# Patient Record
Sex: MALE | Race: WHITE | Employment: UNEMPLOYED | URBAN - METROPOLITAN AREA
[De-identification: names, ages, dates, MRNs, and addresses within clinical notes are randomized per-mention and may not be internally consistent; named-entity substitution may affect disease eponyms.]

---

## 2019-01-10 ENCOUNTER — HOSPITAL ENCOUNTER (INPATIENT)
Age: 69
LOS: 4 days | Discharge: HOME | DRG: 881 | End: 2019-01-14
Attending: PSYCHIATRY & NEUROLOGY | Admitting: PSYCHIATRY & NEUROLOGY
Payer: MEDICARE

## 2019-01-10 PROBLEM — F32.9 MAJOR DEPRESSION: Status: ACTIVE | Noted: 2019-01-10

## 2019-01-10 PROCEDURE — 65220000003 HC RM SEMIPRIVATE PSYCH

## 2019-01-10 RX ORDER — BENZTROPINE MESYLATE 1 MG/ML
2 INJECTION INTRAMUSCULAR; INTRAVENOUS
Status: DISCONTINUED | OUTPATIENT
Start: 2019-01-10 | End: 2019-01-14 | Stop reason: HOSPADM

## 2019-01-10 RX ORDER — ADHESIVE BANDAGE
30 BANDAGE TOPICAL DAILY PRN
Status: DISCONTINUED | OUTPATIENT
Start: 2019-01-10 | End: 2019-01-14 | Stop reason: HOSPADM

## 2019-01-10 RX ORDER — LORAZEPAM 2 MG/ML
2 INJECTION INTRAMUSCULAR
Status: DISCONTINUED | OUTPATIENT
Start: 2019-01-10 | End: 2019-01-14 | Stop reason: HOSPADM

## 2019-01-10 RX ORDER — ZOLPIDEM TARTRATE 5 MG/1
5 TABLET ORAL
Status: DISCONTINUED | OUTPATIENT
Start: 2019-01-10 | End: 2019-01-14 | Stop reason: HOSPADM

## 2019-01-10 RX ORDER — IBUPROFEN 400 MG/1
400 TABLET ORAL
Status: DISCONTINUED | OUTPATIENT
Start: 2019-01-10 | End: 2019-01-14 | Stop reason: HOSPADM

## 2019-01-10 RX ORDER — IBUPROFEN 200 MG
1 TABLET ORAL
Status: DISCONTINUED | OUTPATIENT
Start: 2019-01-10 | End: 2019-01-14 | Stop reason: HOSPADM

## 2019-01-10 RX ORDER — LORAZEPAM 1 MG/1
1 TABLET ORAL
Status: DISCONTINUED | OUTPATIENT
Start: 2019-01-10 | End: 2019-01-11

## 2019-01-10 RX ORDER — ZOLPIDEM TARTRATE 10 MG/1
10 TABLET ORAL
Status: DISCONTINUED | OUTPATIENT
Start: 2019-01-10 | End: 2019-01-10 | Stop reason: DRUGHIGH

## 2019-01-10 RX ORDER — ACETAMINOPHEN 325 MG/1
650 TABLET ORAL
Status: DISCONTINUED | OUTPATIENT
Start: 2019-01-10 | End: 2019-01-14 | Stop reason: HOSPADM

## 2019-01-10 RX ORDER — OLANZAPINE 5 MG/1
5 TABLET ORAL
Status: DISCONTINUED | OUTPATIENT
Start: 2019-01-10 | End: 2019-01-14 | Stop reason: HOSPADM

## 2019-01-10 RX ORDER — BENZTROPINE MESYLATE 2 MG/1
2 TABLET ORAL
Status: DISCONTINUED | OUTPATIENT
Start: 2019-01-10 | End: 2019-01-14 | Stop reason: HOSPADM

## 2019-01-10 NOTE — PROGRESS NOTES
Hospitalist answering service called to request return call from Hospitalist, to notify of consult. [address]  Dr. Krishna Martinez returned call, notified of consult.

## 2019-01-10 NOTE — BH NOTES
Patient admitted voluntarily to [address] [place] [address] Psychiatry, under the services of Josefa Abdalla. Patient currently denies suicidal ideation. Patient currently denies homicidal ideation. Patient verbally contracts for safety. Patient denies psychotic symptoms. Pt denies ETOH use. Pt denies drug use.

## 2019-01-10 NOTE — PROGRESS NOTES
Primary Nurse Ramon Lin RN and Gumaro Barnes RN performed a dual skin assessment on this patient . Patient has some bruising on his left butt cheek. He believes this is from sleeping on the ground. The patient is in no pain. Cristhian score is 23 Patient was admitted to unit by Dr. Juliet Gonzalez for MDD. Patient came voluntarily onto unit, calm, cooperative, smiling. Patient requested and was given something to drink and a snack. Patient was given tour of unit, unit handbook.  
 
JEWELRY:  Patient kept his watch on

## 2019-01-11 PROCEDURE — 74011250637 HC RX REV CODE- 250/637: Performed by: NURSE PRACTITIONER

## 2019-01-11 PROCEDURE — 65220000003 HC RM SEMIPRIVATE PSYCH

## 2019-01-11 PROCEDURE — 74011250637 HC RX REV CODE- 250/637: Performed by: PSYCHIATRY & NEUROLOGY

## 2019-01-11 RX ORDER — ATORVASTATIN CALCIUM 40 MG/1
40 TABLET, FILM COATED ORAL
Status: DISCONTINUED | OUTPATIENT
Start: 2019-01-11 | End: 2019-01-14 | Stop reason: HOSPADM

## 2019-01-11 RX ORDER — SERTRALINE HYDROCHLORIDE 50 MG/1
25 TABLET, FILM COATED ORAL DAILY
Status: DISCONTINUED | OUTPATIENT
Start: 2019-01-11 | End: 2019-01-14 | Stop reason: HOSPADM

## 2019-01-11 RX ORDER — GUAIFENESIN 100 MG/5ML
81 LIQUID (ML) ORAL DAILY
Status: DISCONTINUED | OUTPATIENT
Start: 2019-01-12 | End: 2019-01-14 | Stop reason: HOSPADM

## 2019-01-11 RX ORDER — HYDROXYZINE 25 MG/1
25 TABLET, FILM COATED ORAL
Status: DISCONTINUED | OUTPATIENT
Start: 2019-01-11 | End: 2019-01-14 | Stop reason: HOSPADM

## 2019-01-11 RX ADMIN — SERTRALINE HYDROCHLORIDE 25 MG: 50 TABLET ORAL at 11:53

## 2019-01-11 RX ADMIN — HYDROXYZINE HYDROCHLORIDE 25 MG: 25 TABLET, FILM COATED ORAL at 20:15

## 2019-01-11 RX ADMIN — ATORVASTATIN CALCIUM 40 MG: 40 TABLET, FILM COATED ORAL at 21:30

## 2019-01-11 NOTE — PROGRESS NOTES
Problem: Falls - Risk of 
Goal: *Absence of Falls Document Frank Braga Fall Risk and appropriate interventions in the flowsheet. Outcome: Progressing Towards Goal 
Fall Risk Interventions: 
  
 
  
 
Medication Interventions: Teach patient to arise slowly

## 2019-01-11 NOTE — BH NOTES
GROUP THERAPY PROGRESS NOTE Reed Mueller did not participate in the 70 minute General Unit's Process Group, with a focus on setting a direction or goal, identifying feelings and planning for the day.

## 2019-01-11 NOTE — INTERDISCIPLINARY ROUNDS
Behavioral Health Interdisciplinary Rounds Patient Name: Sharri Eastman  Age: 80 y.o. Room/Bed:  729/ Primary Diagnosis: <principal problem not specified> Admission Status: Voluntary Readmission within 30 days: no 
Power of  in place: no 
Patient requires a blocked bed: no          Reason for blocked bed: VTE Prophylaxis: Not indicated Mobility needs/Fall risk: no 
Flu Vaccine : no refused Nutritional Plan: no 
Consults:         
Labs/Testing due today?: yes Sleep hours:       
Participation in Care/Groups:  yes Medication Compliant?: No scheduled meds PRNS (last 24 hours): None Restraints (last 24 hours):  no 
  
CIWA (range last 24 hours): CIWA-Ar Total: 0  
COWS (range last 24 hours): COWS Total: 1 Alcohol screening (AUDIT) completed -   AUDIT Score: 0 If applicable, date SBIRT discussed in treatment team AND documented:  
 
Tobacco - patient is a smoker: Have You Used Tobacco in the Past 30 Days: Yes Illegal Drugs use: Have You Used Any Illegal Substances Over the Past 12 Months: No 
 
24 hour chart check complete: yes Patient goal(s) for today: Meet with tx team to develop course of tx Treatment team focus/goals: Complete Psych Social Assessment Progress note :  
 
LOS:  1  Expected LOS:  
 
Financial concerns/prescription coverage:   
Date of last family contact:     
Family requesting physician contact today:   
Discharge plan: TBD Homeless with no support system Guns in the home: Denied Outpatient provider(s)Participating treatment team members: Beltran Thurman RN and

## 2019-01-11 NOTE — BH NOTES
CM Note: TAWANDA left VM with Graciela Roach(  who is the coordinator for [facility]) @  [phone] to call back to discuss possible assistant with pt to enter their program

## 2019-01-11 NOTE — BH NOTES
PSYCHOSOCIAL ASSESSMENT 
:Patient identifying info: 
Abdifatah Almaguer is a 80 y.o., male admitted 1/10/2019  2:48 PM  
 
Presenting problem and precipitating factors: Pt was voluntarily admitted to the hospital due to depression and SI plan of jumping off a bridge. Pt cited feeling hopeless and having no support. He stated his son and his whereabouts are unknown. Team discussed current state of homelessness, lack of mental health tx Pt agreed to take medication for depression and follow up with Mental status assessment: Sad, feeling hopeless  and helpless Collateral information: None Current psychiatric /substance abuse providers and contact info:  Pt is not currently receiving any mental health services. Previous psychiatric/substance abuse providers and response to treatment: [facility]  
 
Family history of mental illness or substance abuse:  
 
Substance abuse history:  Pt has history of using tobacco and he is a daily smoker. He denied using any other drugs or alcohol. His UDS was negative for tested drugs. Social History Tobacco Use  Smoking status: Current Every Day Smoker  Smokeless tobacco: Never Used Substance Use Topics  Alcohol use: No  
  Frequency: Never History of biomedical complications associated with substance abuse : N/A Patient's current acceptance of treatment or motivation for change: 
 
Family constellation: One adult son Is significant other involved? N/A Describe support system: None Describe living arrangements and home environment: Pt is , has one son ( unk address) and he is homeless. It is noted that pt has janet homeless for the past five years and he refused to be placed in a shelter. Pt prefers to sleep outdoors. Health issues: Review H&P Hospital Problems  Never Reviewed Codes Class Noted POA Major depression ICD-10-CM: F32.9 ICD-9-CM: 296.20  1/10/2019 Unknown Trauma history: Nightmares connected to [employer] Legal issues: Pt denied any current legal issues. History of  service: [facility] 7 yrs. Financial status: None Advent/cultural factors: Not voiced Education/work history:  HS grad and currently unemployed Have you been licensed as a health care professional (current or ):  No 
 
Leisure and recreation preferences:  
 
Describe coping skills:  Ineffective and poor judgement Jair Jacob 2019

## 2019-01-11 NOTE — PROGRESS NOTES
01/10/19 1900 Vital Signs Temp (refused) Pulse (Heart Rate) (refused) Resp Rate (refused) O2 Sat (%) (refused) Level of Consciousness Alert  
BP (refused) No MEWS score generated, as pt refuses to allow staff to check vital signs.

## 2019-01-11 NOTE — PROGRESS NOTES
[facility] 60 Master Treatment Plan for Keri Mora Date Treatment Plan Initiated: 1/11/19 Treatment Plan Modalities: 
Type of Modality Amount (x minutes) Frequency (x/week) Duration (x days) Name of Responsible Staff Community & wrap-up meetings to encourage peer interactions 15 7 [facility] [facility] Group psychotherapy to assist in building coping skills and internal controls 60 7 [address] Therapeutic activity groups to build coping skills 60 7 1 Garland Vazquez Psychoeducation in group setting to address:  
Medication education [facility] 224 Coping skills Relaxation techniques Symptom management [facility]charge planning [facility] Spirituality [address] [facility] 60 1 1 volunteer Recovery/AA/NA 
    volunteer Physician medication management 15 7 1 Dr Vitaliy Parham Family meeting/discharge planning Froylan Newby Problem: Depressed Mood (Adult/Pediatric)  These goals will be met by 1/15/18 Goal: *STG: Participates in treatment plan Outcome: Progressing Towards Goal 
Up on unit for meal only declined to attend groups states will not participate while here. Mood and affect slight suspicious and defensive. Daily goal is to sleep Goal: *STG: Verbalizes anger, guilt, and other feelings in a constructive manor Outcome: Progressing Towards Goal 
Angry about his lack of finances and housing Goal: *STG: Attends activities and groups Outcome: Not Progressing Towards Goal 
Declines to participate Goal: *STG: Demonstrates reduction in symptoms and increase in insight into coping skills/future focused Outcome: Progressing Towards Goal 
States only SI when he is cold and tired from being homeless and sleeping on sidewalk Goal: *STG: Complies with medication therapy Outcome: Progressing Towards Goal 
States he will accept zoloft Goal: Interventions Outcome: Progressing Towards Goal 
Staff focus is limit setting, offering support and medication education
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
## 2019-01-11 NOTE — PROGRESS NOTES
Admission Medication Reconciliation: 
 
Information obtained from:  Patient interview Comments/Recommendations: Updated PTA meds/reviewed patient's allergies. 1)  Patient reports getting medications through the [address] pharmacy in the past. He reports being on lisinopril (unknown dose) and something for cholesterol. The patient reports non-compliance to his medications as he is homeless. Pharmacist was unable to confirm medications with the [place]. 2)  Medication changes (since last review): Added 
- lisinopril - unknown dose, has NOT been taking PTA Allergies:  Patient has no known allergies. Significant PMH/Disease States:  
Past Medical History:  
Diagnosis Date  Hypertension  Suicidal thoughts Chief Complaint for this Admission:  No chief complaint on file. Prior to Admission Medications:  
Prior to Admission Medications Prescriptions Last Dose Informant Patient Reported? Taking? LISINOPRIL PO Not Taking at Unknown time  Yes No  
  
Facility-Administered Medications: None Alo Lopez, PharmD, BCPP, BCPS Clinical Pharmacy Specialist, [address] [place] [address]

## 2019-01-11 NOTE — BH NOTES
GROUP THERAPY PROGRESS NOTE Elizabeth Dooley is participating in reflection group. Group time: 15 minutes Personal goal for participation: Daily progress Goal orientation: personal 
 
Group therapy participation: passive Therapeutic interventions reviewed and discussed: Unit rules and regulations. 10 tips to be grateful and consegently be more happy. Impression of participation: Passive

## 2019-01-11 NOTE — CONSULTS
Hospitalist Consult for Medical H&P Note  RITU Lizarraga  Phone [phone]  Call physician on-call through the  7pm-7am    Primary Care Provider: None  Source of Information: Patient/Chart     History of Presenting Illness:   Pt transferred to [facility] [place] from [place] [address] for psychiatric care. Pt apparently homeless, has been having worsening depression and having suicidal ideations. Pmhx includes htn, CAD with stents, stroke. Denies hx of diabetes. + 1 ppd smoker. Pt indicated that he has trouble walking at times due to leg weakness and dizziness and has fallen down \"alot\". Review of Systems:  General: negative for fever, chills, sweats. Eyes: negative for changes or loss in vision  Ear Nose and Throat: negative for rhinorrhea, otalgia, speech or swallowing difficulties  Respiratory:  negative for cough, sputum production, SOB, wheezing, BENOIT  Cardiology:  negative for chest pain, palpitations, orthopnea, edema. Pt reports some dizziness at times  Gastrointestinal: negative for abdominal pain, N/V, change in bowel habits (hx of intermittent diarrhea, none at present)  Genitourinary: negative for frequency, urgency, dysuria, hematuria. Has had some urinary incontinence for past year  Musculoskeletal : negative for arthralgia, myalgia. + BLE weakness (not new)  Skin: negative for easy bruising, bleeding, negative for rash, ulceration, new lesion  Neurological: negative for headache, confusion or memory loss. + R arm weakness and some dizziness  Psychological: negative for anxiety, depression, agitation    Past Medical History:   Diagnosis Date    Hypertension     Suicidal thoughts       No past surgical history on file. Pt was supposed to be taking lisinopril or combo med - he does not recall specifics.     No Known Allergies     SOCIAL HISTORY:  Smoking history:   None    Former    Chronic xx     CODE STATUS:  DNR xx   Full    Other      Objective:   Physical Exam:   Visit Vitals  BP (!) 171/95   Pulse 60   Temp 97.4 °F (36.3 °C)   Resp 18   SpO2 94%         General:  Alert, cooperative, no distress, appears stated age. HEENT:  Normocephalic, atraumatic. Conjunctivae/corneas clear. EOMs intact. Nares nl. Septum midline. Mucosa nl. No drainage or sinus tenderness. Back:   Symmetric, no curvature. ROM nl.     Lungs:   Clear to auscultation bilaterally. No Wheezing/Rhonchi/Rales. No SOB, no accessory muscle use and on RA    Heart:  Regular rate and rhythm. No murmur    Abdomen:   Soft, non-tender. Bowel sounds nl. Extremities: Extremities nl, atraumatic, no edema. Pulses 2+ and symmetric    Skin: Skin color, texture, turgor nl. No rashes or lesions. Psych: Cooperative, not anxious or agitated. A/O x 3. Neurologic: CNII-XII grossly intact. Moving all extremities, speech clear. Pt with R arm weakness, grasp 3/5 on right. Sensation is intact. Data Review:   24 Hour Results:  Labs from [place] reviewed. Assessment/Plan     Depression with Suicidal Ideations: treatment as per primary team    Hx CAD with Stents:  - was on plavix but he indicated his physician told him he did not need to continue this, estimates stents done ~ 1 year ago  - adding baby ASA  - start lipitor nightly    Hx Stroke (x 2 in 2008):  - ASA and statin to start  - has R sided residual arm weakness and reports BLE weakness  - obtain lipid panel  - PT as needed    Hx Hypertension:   - BP up and down per [facility] records. Pt not taking medications PTA. - monitor this - he has been on prinzide 20/25 in the past and if blood pressures are persistently high, can restart    Hx tobacco Use:   - 1 ppd smoker  - declines nicotine patch    Code Status: Full  DVT ppx : none indicated, pt up ad jose  Care Plan Discussed with: patient  Dispo: as per primary team    Thank you for giving us opportunity to participate in this patients care.  Will sign off at this time, please re-consult if there are any further medical management needs or questions       Signed By: Chapo Giron NP     January 11, 2019

## 2019-01-12 PROCEDURE — 74011250637 HC RX REV CODE- 250/637: Performed by: NURSE PRACTITIONER

## 2019-01-12 PROCEDURE — 65220000003 HC RM SEMIPRIVATE PSYCH

## 2019-01-12 PROCEDURE — 74011250637 HC RX REV CODE- 250/637: Performed by: PSYCHIATRY & NEUROLOGY

## 2019-01-12 RX ADMIN — SERTRALINE HYDROCHLORIDE 25 MG: 50 TABLET ORAL at 10:39

## 2019-01-12 RX ADMIN — ASPIRIN 81 MG CHEWABLE TABLET 81 MG: 81 TABLET CHEWABLE at 10:39

## 2019-01-12 NOTE — PROGRESS NOTES
Problem: Depressed Mood (Adult/Pediatric) Goal: *STG: Remains safe in hospital 
Outcome: Progressing Towards Goal 
Patient is calm and cooperative. Sitting in the day room. No distress noted.

## 2019-01-12 NOTE — PROGRESS NOTES
Problem: Depressed Mood (Adult/Pediatric) Goal: *STG: Verbalizes anger, guilt, and other feelings in a constructive manor Outcome: Progressing Towards Goal 
Patient calm/cooperative. Staff will continue q15 checks and encourage pt to share feelings.

## 2019-01-12 NOTE — PROGRESS NOTES
Problem: Falls - Risk of 
Goal: *Absence of Falls Document Anusha Patel Fall Risk and appropriate interventions in the flowsheet. Outcome: Progressing Towards Goal 
Fall Risk Interventions: 
Medication Interventions: Teach patient to arise slowly Received pt lying quietly in bed, appears to be asleep. No pain or distress noted. Respirations even and unlabored. Will continue to monitor q15 min for safety.

## 2019-01-12 NOTE — BH NOTES
GROUP THERAPY PROGRESS NOTE Shekhar Swanson is participating in Reflection Group Group time: 15 mins Personal goal for participation: [facility] 
 
Goal orientation: personal 
 
Group therapy participation: active Therapeutic interventions reviewed and discussed:  Tech discussed rules of unit, importance of goals, encouraged goals, positivity and healing. Impression of participation: Active and listened

## 2019-01-12 NOTE — PROGRESS NOTES
Problem: Depressed Mood (Adult/Pediatric) Goal: *STG: Participates in treatment plan Outcome: Progressing Towards Goal 
Pt is med compliant. States he is continuing to feel depressed. He is quiet and withdrawn. SW working on shelter and discharge plan 
 
Problem: Falls - Risk of 
Goal: *Absence of Falls Document Ebb Frisian Fall Risk and appropriate interventions in the flowsheet. Fall Risk Interventions: 
  
 
  
 
Medication Interventions: Teach patient to arise slowly

## 2019-01-12 NOTE — INTERDISCIPLINARY ROUNDS
Behavioral Health Interdisciplinary Rounds Patient Name: Miguel Medrano  Age: 80 y.o. Room/Bed:  729/ Primary Diagnosis: <principal problem not specified> Admission Status: Voluntary Readmission within 30 days: no 
Power of  in place: no 
Patient requires a blocked bed: no          Reason for blocked bed: VTE Prophylaxis: No 
 
Mobility needs/Fall risk: no 
Flu Vaccine : no  
Nutritional Plan: no 
Consults:         
Labs/Testing due today?: yes  *Refused* Sleep hours:    7 Participation in Care/Groups:  yes Medication Compliant?: Yes PRNS (last 24 hours): Antianxiety Restraints (last 24 hours):  no 
  
CIWA (range last 24 hours): CIWA-Ar Total: 0  
COWS (range last 24 hours): COWS Total: 1 Alcohol screening (AUDIT) completed -   AUDIT Score: 0 If applicable, date SBIRT discussed in treatment team AND documented:  
 
Tobacco - patient is a smoker: Have You Used Tobacco in the Past 30 Days: Yes Illegal Drugs use: Have You Used Any Illegal Substances Over the Past 12 Months: No 
 
24 hour chart check complete: yes Patient goal(s) for today: Comply with taking meds and attending unit activities Treatment team focus/goals: Eval meds for their effectiveness Progress note: Good mood- hopeful that [facility] can help him with homelessness LOS:  2  Expected LOS:  
 
Financial concerns/prescription coverage:  No 
Date of last family contact:  None Family requesting physician contact today:  no 
Discharge plan[facility]  N/A Outpatient provider(s): Refer to [facility]  
 
Participating treatment team members: Miguel MarcelaMarco A , Dr Josias Valencia and Gladis Hendricks

## 2019-01-13 PROCEDURE — 65220000003 HC RM SEMIPRIVATE PSYCH

## 2019-01-13 PROCEDURE — 74011250637 HC RX REV CODE- 250/637: Performed by: NURSE PRACTITIONER

## 2019-01-13 PROCEDURE — 74011250637 HC RX REV CODE- 250/637: Performed by: PSYCHIATRY & NEUROLOGY

## 2019-01-13 RX ADMIN — ASPIRIN 81 MG CHEWABLE TABLET 81 MG: 81 TABLET CHEWABLE at 08:27

## 2019-01-13 NOTE — BH NOTES
Patient is disengaged and often lying in his bed. When nurse attempted to speak with patient, he has an angry facial expression and did not answer. He only stared at nurse with an angry facial expression. Nurse quietly entered room to see about checking his vital signs, patient was angry, \"You gotta be kidding me! \" Patient speaking in verbally aggressive tone. Refused vital signs.

## 2019-01-13 NOTE — INTERDISCIPLINARY ROUNDS
Behavioral Health Interdisciplinary Rounds Patient Name: Sharlene Mccracken  Age: 80 y.o. Room/Bed:  9/ Primary Diagnosis: <principal problem not specified> Admission Status: Voluntary Readmission within 30 days: no 
Power of  in place: no 
Patient requires a blocked bed: no          Reason for blocked bed: VTE Prophylaxis: No 
 
Mobility needs/Fall risk: no 
Flu Vaccine : no  
Nutritional Plan: no 
Consults:         
Labs/Testing due today?: yes  *Refused* Sleep hours:  7 Participation in Care/Groups:  yes Medication Compliant?: Selective PRNS (last 24 hours): None Restraints (last 24 hours):  no 
  
CIWA (range last 24 hours): CIWA-Ar Total: 0  
COWS (range last 24 hours): COWS Total: 1 Alcohol screening (AUDIT) completed -   AUDIT Score: 0 If applicable, date SBIRT discussed in treatment team AND documented:  
 
Tobacco - patient is a smoker: Have You Used Tobacco in the Past 30 Days: Yes Illegal Drugs use: Have You Used Any Illegal Substances Over the Past 12 Months: No 
 
24 hour chart check complete: yes Patient goal(s) for today:  
Treatment team focus/goals:  
Progress note LOS:  3  Expected LOS:  
 
Financial concerns/prescription coverage:   
Date of last family contact:      
Family requesting physician contact today:   
Discharge plan:  
Guns in the home: Outpatient provider(s):  
 
Participating treatment team members: Sharlene Mccracken, * (assigned SW),

## 2019-01-13 NOTE — BH NOTES
GROUP THERAPY PROGRESS NOTE 
  
Keri Mora is participating in community meeting/Wrap up Reflections Group.  
  
Group time: 30 minutes 
  
Personal goal for participation: share your goals for the day and if you met them. Also discussed will be unit routines and schedule 
  
Goal orientation: community 
  
Group therapy participation: passive 
  Therapeutic interventions reviewed and discussed:  
 +coping skills Impression of participation: quiet during group

## 2019-01-13 NOTE — PROGRESS NOTES
Problem: Depressed Mood (Adult/Pediatric) Goal: *STG: Verbalizes anger, guilt, and other feelings in a constructive manor Outcome: Progressing Towards Goal 
Patient is lying in bed with lights dim. Patient is apathetic, lying in bed and not interacting with others. Staff continue q15 checks and encourage participation in activities and verbalization of feelings.
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
## 2019-01-13 NOTE — PROGRESS NOTES
Problem: Depressed Mood (Adult/Pediatric) Goal: *STG: Verbalizes anger, guilt, and other feelings in a constructive manor Outcome: Progressing Towards Goal 
Pt able to verbalize feelings in a constructive manor. Isolating in his room. Goal: *STG: Attends activities and groups Outcome: Progressing Towards Goal 
Declined to attend the community Group. Goal: *STG: Remains safe in hospital 
Outcome: Progressing Towards Goal 
Remains safe in the hospital and continued on Q 15 minute safety checks. Goal: *STG: Complies with medication therapy Outcome: Progressing Towards Goal 
Refused to take Zoloft as scheduled. Notified Dr. Srinath Nino pt refused Zoloft and Labs.

## 2019-01-13 NOTE — PROGRESS NOTES
Problem: Falls - Risk of 
Goal: *Absence of Falls Document Anusha Patel Fall Risk and appropriate interventions in the flowsheet. Outcome: Progressing Towards Goal 
Fall Risk Interventions: 
Mentation Interventions: Adequate sleep, hydration, pain control Medication Interventions: Teach patient to arise slowly Elimination Interventions: Toilet paper/wipes in reach History of Falls Interventions: Door open when patient unattended Received pt lying in bed, quite. No pain or distress noted. Respirations even and unlabored. Will cont to  Monitor q15min for safety. Pt refused labs
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
## 2019-01-13 NOTE — BH NOTES
Pt was present for the evening group. He is withdrawn. Patient came up to med window and was cooperative. When offered bedtime med/lipitor. He said in loud voice, \"I don't need that\"

## 2019-01-14 VITALS
WEIGHT: 199 LBS | TEMPERATURE: 97.8 F | HEIGHT: 70 IN | BODY MASS INDEX: 28.49 KG/M2 | RESPIRATION RATE: 16 BRPM | OXYGEN SATURATION: 98 % | SYSTOLIC BLOOD PRESSURE: 163 MMHG | HEART RATE: 66 BPM | DIASTOLIC BLOOD PRESSURE: 79 MMHG

## 2019-01-14 LAB
CHOLEST SERPL-MCNC: 139 MG/DL
EST. AVERAGE GLUCOSE BLD GHB EST-MCNC: 117 MG/DL
HBA1C MFR BLD: 5.7 % (ref 4.2–6.3)
HDLC SERPL-MCNC: 40 MG/DL
HDLC SERPL: 3.5 {RATIO} (ref 0–5)
LDLC SERPL CALC-MCNC: 82.4 MG/DL (ref 0–100)
LIPID PROFILE,FLP: NORMAL
TRIGL SERPL-MCNC: 83 MG/DL (ref ?–150)
TSH SERPL DL<=0.05 MIU/L-ACNC: 1.97 UIU/ML (ref 0.36–3.74)
VLDLC SERPL CALC-MCNC: 16.6 MG/DL

## 2019-01-14 PROCEDURE — 80061 LIPID PANEL: CPT

## 2019-01-14 PROCEDURE — 36415 COLL VENOUS BLD VENIPUNCTURE: CPT

## 2019-01-14 PROCEDURE — 74011250637 HC RX REV CODE- 250/637: Performed by: NURSE PRACTITIONER

## 2019-01-14 PROCEDURE — 74011250637 HC RX REV CODE- 250/637: Performed by: PSYCHIATRY & NEUROLOGY

## 2019-01-14 PROCEDURE — 83036 HEMOGLOBIN GLYCOSYLATED A1C: CPT

## 2019-01-14 PROCEDURE — 84443 ASSAY THYROID STIM HORMONE: CPT

## 2019-01-14 RX ORDER — LISINOPRIL 10 MG/1
10 TABLET ORAL DAILY
Status: DISCONTINUED | OUTPATIENT
Start: 2019-01-14 | End: 2019-01-14 | Stop reason: HOSPADM

## 2019-01-14 RX ORDER — GUAIFENESIN 100 MG/5ML
81 LIQUID (ML) ORAL DAILY
Qty: 30 TAB | Refills: 0 | Status: SHIPPED | OUTPATIENT
Start: 2019-01-15

## 2019-01-14 RX ORDER — ATORVASTATIN CALCIUM 40 MG/1
40 TABLET, FILM COATED ORAL
Qty: 30 TAB | Refills: 0 | Status: SHIPPED | OUTPATIENT
Start: 2019-01-14

## 2019-01-14 RX ORDER — LISINOPRIL 10 MG/1
10 TABLET ORAL DAILY
Qty: 30 TAB | Refills: 0 | Status: SHIPPED | OUTPATIENT
Start: 2019-01-14

## 2019-01-14 RX ADMIN — ASPIRIN 81 MG CHEWABLE TABLET 81 MG: 81 TABLET CHEWABLE at 08:25

## 2019-01-14 RX ADMIN — LISINOPRIL 10 MG: 10 TABLET ORAL at 12:15
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
## 2019-01-14 NOTE — BH NOTES
Behavioral Health Transition Record to Provider Patient Name: Phillip Blackwood YOB: 1950 Medical Record Number: [id number] Date of Admission: 1/10/2019 Date of Discharge: 1/14/2019 Attending Provider: Constantine Sloan MD 
Discharging Provider: Constantine Sloan MD 
To contact this individual call [phone] and ask the  to page. If unavailable, ask to be transferred to [facility] Provider on call. [facility] Provider will be available on call 24/7 and during holidays. Primary Care Provider: None No Known Allergies Reason for Admission: Pt admitted under a voluntary basis for evere depression with suicidal ideations and an inability to care for self. Admission Diagnosis: Major depression [F32.9] * No surgery found * Results for orders placed or performed during the hospital encounter of 01/10/19 HEMOGLOBIN A1C WITH EAG Result Value Ref Range Hemoglobin A1c 5.7 4.2 - 6.3 % Est. average glucose 117 mg/dL LIPID PANEL Result Value Ref Range LIPID PROFILE Cholesterol, total 139 <200 MG/DL Triglyceride 83 <150 MG/DL  
 HDL Cholesterol 40 MG/DL  
 LDL, calculated 82.4 0 - 100 MG/DL VLDL, calculated 16.6 MG/DL  
 CHOL/HDL Ratio 3.5 0 - 5.0 TSH 3RD GENERATION Result Value Ref Range TSH 1.97 0.36 - 3.74 uIU/mL Immunizations administered during this encounter: There is no immunization history on file for this patient. Screening for Metabolic Disorders for Patients on Antipsychotic Medications 
(Data obtained from the EMR) Estimated Body Mass Index Estimated body mass index is 28.55 kg/m² as calculated from the following: 
  Height as of this encounter: 5' 10\" (1.778 m). Weight as of this encounter: 90.3 kg (199 lb). Vital Signs/Blood Pressure Visit Vitals /79 Pulse 66 Temp 97.8 °F (36.6 °C) Resp 16 Ht 5' 10\" (1.778 m) Wt 90.3 kg (199 lb) SpO2 98% BMI 28.55 kg/m² Blood Glucose/Hemoglobin A1c No results found for: GLU, GLUCPOC Lab Results Component Value Date/Time Hemoglobin A1c 5.7 2019 05:38 AM  
 
  
Lipid Panel Lab Results Component Value Date/Time Cholesterol, total 139 2019 05:38 AM  
 HDL Cholesterol 40 2019 05:38 AM  
 LDL, calculated 82.4 2019 05:38 AM  
 Triglyceride 83 2019 05:38 AM  
 CHOL/HDL Ratio 3.5 2019 05:38 AM  
 
  
Discharge Diagnosis: Major depression (ICD-10-CM: F32.9) Discharge Plan: Patient discharged to [facility] CENTER of Entry. DISCHARGE Izabela Bearden : 1950 MRN: [id number] The patient Phillip Blackwood exhibits the ability to control behavior in a less restrictive environment. Patient's level of functioning is improving. No assaultive/destructive behavior has been observed for the past 24 hours. No suicidal/homicidal threat or behavior has been observed for the past 24 hours. There is no evidence of serious medication side effects. Patient has not been in physical or protective restraints for at least the past 24 hours. If weapons involved, how are they secured? No weapons involved. Is patient aware of and in agreement with discharge plan? Yes Arrangements for medication:  Prescriptions given to Patient. [facility] [place] 
 
Arrangements for transportation home:  Taxi to [facility] Keep all follow up appointments as scheduled, continue to take prescribed medications per physician instructions. Mental health crisis number:  989 or your local mental health crisis line number at [phone] [facility]) or [phone] [facility]). Discharge Medication List and Instructions:  
Discharge Medication List as of 2019  1:52 PM  
  
START taking these medications Details  
aspirin 81 mg chewable tablet Take 1 Tab by mouth daily. , Print, Disp-30 Tab, R-0  
  
 atorvastatin (LIPITOR) 40 mg tablet Take 1 Tab by mouth nightly. , Print, Disp-30 Tab, R-0  
  
  
CONTINUE these medications which have CHANGED Details  
lisinopril (PRINIVIL, ZESTRIL) 10 mg tablet Take 1 Tab by mouth daily. , Print, Disp-30 Tab, R-0 Unresulted Labs (24h ago, onward) None To obtain results of studies pending at discharge, please contact [phone] Follow-up Information Follow up With Specialties Details Why Contact Info [address] [employer]  Call Call between 8:00am and 4:30pm to schedule an appointment with the [place]. For urgent needs, they provide same-day mental health care and 24/7 psychiatric service in our emergency room. [address] [facility]) First floor, Room [address]D-[address] [facility] 
[phone] Ext. [phone] [facility] Point of Entry  Go on 1/14/2019 The [employer] location is open Monday-Friday from 8:30am-5:00pm. Assessments are conducted until 4:00pm. [address] [place], [address] 
[phone] None    None ([phone]) Patient stated that they have no PCP Advanced Directive:  
Does the patient have an appointed surrogate decision maker? No 
Does the patient have a Medical Advance Directive? No 
Does the patient have a Psychiatric Advance Directive? No 
If the patient does not have a surrogate or Medical Advance Directive AND Psychiatric Advance Directive, the patient was offered information on these advance directives Yes and Patient declined to complete Patient Instructions: Please continue all medications until otherwise directed by physician. Tobacco Cessation Discharge Plan:  
Is the patient a smoker and needs referral for smoking cessation? Yes Patient referred to the following for smoking cessation with an appointment? Refused Patient was offered medication to assist with smoking cessation at discharge? Refused Was education for smoking cessation added to the discharge instructions? Yes Alcohol/Substance Abuse Discharge Plan:  
Does the patient have a history of substance/alcohol abuse and requires a referral for treatment? No 
Patient referred to the following for substance/alcohol abuse treatment with an appointment? Not applicable Patient was offered medication to assist with alcohol cessation at discharge? Not applicable Was education for substance/alcohol abuse added to discharge instructions? No 
 
Patient discharged to Home; discussed with patient/caregiver and provided to the patient/caregiver either in hard copy or electronically.

## 2019-01-14 NOTE — BH NOTES
Pt's personal belongings returned to Pt. Discharge instructions reviewed with Pt. All questions answered. Pt taken via wheelchair by staff to lift ride.
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
## 2019-01-14 NOTE — PROGRESS NOTES
Pharmacist Discharge Medication Reconciliation Discharging Provider: Dr. Zuleika Patel Significant PMH:  
Past Medical History:  
Diagnosis Date  Hypertension  Suicidal thoughts Chief Complaint for this Admission: No chief complaint on file. Allergies: Patient has no known allergies. Discharge Medications:  
Current Discharge Medication List  
  
 
START taking these medications Details  
aspirin 81 mg chewable tablet Take 1 Tab by mouth daily. Qty: 30 Tab, Refills: 0  
  
atorvastatin (LIPITOR) 40 mg tablet Take 1 Tab by mouth nightly. Qty: 30 Tab, Refills: 0 CONTINUE these medications which have CHANGED Details  
lisinopril (PRINIVIL, ZESTRIL) 10 mg tablet Take 1 Tab by mouth daily. Qty: 30 Tab, Refills: 0 The patient's chart, MAR and AVS were reviewed by Thomas Mejia, RAMÓND.

## 2019-01-14 NOTE — DISCHARGE INSTRUCTIONS
DISCHARGE SUMMARY    Almaz Leyva  : 1950  MRN: [id number]    The patient Sharri Eastman exhibits the ability to control behavior in a less restrictive environment. Patient's level of functioning is improving. No assaultive/destructive behavior has been observed for the past 24 hours. No suicidal/homicidal threat or behavior has been observed for the past 24 hours. There is no evidence of serious medication side effects. Patient has not been in physical or protective restraints for at least the past 24 hours. If weapons involved, how are they secured? No weapons involved. Is patient aware of and in agreement with discharge plan? Yes    Arrangements for medication:  Prescriptions given to Patient. Copy of discharge instructions to provider?:  [facility]) AT [place]    Arrangements for transportation home:  Taxi to [facility]    Keep all follow up appointments as scheduled, continue to take prescribed medications per physician instructions. Mental health crisis number:  037 or your local mental health crisis line number at [phone] [facility]) or [phone] [facility]. DISCHARGE SUMMARY from Nurse    PATIENT INSTRUCTIONS:    ·     What to do at Home:  Recommended activity: Activity as tolerated,     If you experience any of the following symptoms thoughts of harming self, feeling overwhelmed with hopelessness, please follow up with your local crisis number at [phone]. *  Please give a list of your current medications to your Primary Care Provider. *  Please update this list whenever your medications are discontinued, doses are      changed, or new medications (including over-the-counter products) are added. *  Please carry medication information at all times in case of emergency situations.     These are general instructions for a healthy lifestyle:    No smoking/ No tobacco products/ Avoid exposure to second hand smoke  Surgeon [place] Warning:  Quitting smoking now greatly reduces serious risk to your health. Obesity, smoking, and sedentary lifestyle greatly increases your risk for illness    A healthy diet, regular physical exercise & weight monitoring are important for maintaining a healthy lifestyle    You may be retaining fluid if you have a history of heart failure or if you experience any of the following symptoms:  Weight gain of 3 pounds or more overnight or 5 pounds in a week, increased swelling in our hands or feet or shortness of breath while lying flat in bed. Please call your doctor as soon as you notice any of these symptoms; do not wait until your next office visit. Recognize signs and symptoms of STROKE:    F-face looks uneven    A-arms unable to move or move unevenly    S-speech slurred or non-existent    T-time-call 911 as soon as signs and symptoms begin-DO NOT go       Back to bed or wait to see if you get better-TIME IS BRAIN. Warning Signs of HEART ATTACK     Call 911 if you have these symptoms:   Chest discomfort. Most heart attacks involve discomfort in the center of the chest that lasts more than a few minutes, or that goes away and comes back. It can feel like uncomfortable pressure, squeezing, fullness, or pain.  Discomfort in other areas of the upper body. Symptoms can include pain or discomfort in one or both arms, the back, neck, jaw, or stomach.  Shortness of breath with or without chest discomfort.  Other signs may include breaking out in a cold sweat, nausea, or lightheadedness. Don't wait more than five minutes to call 911 - MINUTES MATTER! Fast action can save your life. Calling 911 is almost always the fastest way to get lifesaving treatment. Emergency Medical Services staff can begin treatment when they arrive -- up to an hour sooner than if someone gets to the hospital by car. The discharge information has been reviewed with the patient. The patient verbalized understanding.   Discharge medications reviewed with the patient and appropriate educational materials and side effects teaching were provided.   ___________________________________________________________________________________________________________________________________

## 2019-01-14 NOTE — PROGRESS NOTES
Problem: Falls - Risk of 
Goal: *Absence of Falls Document [place] Shows Fall Risk and appropriate interventions in the flowsheet. Outcome: Progressing Towards Goal 
Fall Risk Interventions:  Patient has not been involved in any falls since arriving on this unit. He is monitored at least every fifteen minutes for safety. He is currently sleeping. No stress noted Mentation Interventions: Adequate sleep, hydration, pain control Medication Interventions: Teach patient to arise slowly Elimination Interventions: Toilet paper/wipes in reach History of Falls Interventions: Door open when patient unattended

## 2019-01-14 NOTE — PROGRESS NOTES
Problem: Depressed Mood (Adult/Pediatric) Goal: *STG: Participates in treatment plan Outcome: Progressing Towards Goal 
Out on unit passively engaged. Mood and affect flat to euthymic. Does not report SI no self harming behaviors. Future focused with much conversation around obtaining stable housing. Continues to request tx team provide him with permanent housing. Staff continue to educate him that permanent housing is a long term goal to address as outpatient. Declining to accept antidepressant with no explanation and declines to discuss medications. Daily goal is to find housing. Staff focus is on d/c planning

## 2019-01-14 NOTE — BH NOTES
GROUP THERAPY PROGRESS NOTE Rachel Holstein is participating in [facility]. Group time: 30 minutes Personal goal for participation: discuss goals, unit guidelines and routines. Goal orientation: community Group therapy participation: passive Therapeutic interventions reviewed and discussed:  
 
Impression of participation: present

## 2019-01-14 NOTE — INTERDISCIPLINARY ROUNDS
Behavioral Health Interdisciplinary Rounds Patient Name: Erin Garcia  Age: 80 y.o. Room/Bed:  [facility] Primary Diagnosis: <principal problem not specified> Admission Status: Voluntary Readmission within 30 days: no 
Power of  in place: no 
Patient requires a blocked bed: no          Reason for blocked bed: VTE Prophylaxis: No 
 
Mobility needs/Fall risk: no 
Flu Vaccine : no  
Nutritional Plan: no 
Consults:         
Labs/Testing due today?: yes Sleep hours:  5.30 Participation in Care/Groups:  yes Medication Compliant?: Refusing Psychiatric Medications and Refusing Medical Medications PRNS (last 24 hours): None Restraints (last 24 hours):  no 
  
CIWA (range last 24 hours): CIWA-Ar Total: 0  
COWS (range last 24 hours): COWS Total: 1 Alcohol screening (AUDIT) completed -   AUDIT Score: 0 If applicable, date SBIRT discussed in treatment team AND documented:  
 
Tobacco - patient is a smoker: Have You Used Tobacco in the Past 30 Days: Yes Illegal Drugs use: Have You Used Any Illegal Substances Over the Past 12 Months: No 
 
24 hour chart check complete: yes Patient goal(s) for today: Discharge Treatment team focus/goals: Discharge Progress note: Pt is stable and ready for discharge LOS:  4  Expected LOS: 4 Financial concerns/prescription coverage:  Medicare Date of last family contact: None Family requesting physician contact today:  no 
Discharge plan: Homeless Point of Entry Guns in the home: No       
Outpatient provider(s): Loc Taylor Participating treatment team members: Erin Garcia, MAIA Arnold; Dr. Ayana Carter MD; Prakash Hernandez RN; Key Kowalski, DanaD

## 2019-01-14 NOTE — BH NOTES
GROUP THERAPY PROGRESS NOTE Leonor Jane partially participated in the General Unit's Process Group, with a focus identifying feelings, planning for the day, and considering the relationship between loneliness and running. . 
Group time: 75 minutes. Personal goal for participation: To increase the capacity to identify and improve ones mood and to consider what one might be running from and how one handles isolation. . 
  
Goal orientation: The patient will be able to identify their feelings, develop a plan for structuring their day, and consider their tendencies to run or isolate as they develop their discharge plans. Group therapy participation: With prompting, this patient marginally and minimally participated in the group. Therapeutic interventions reviewed and discussed: The group members were asked to introduce themselves to each other and to see if they could identify an emotion they are having and/or let the group know what they want to focus on for the day as they continue to make discharge plans. They were also provided a handout that they could complete on their own that asks the patients to consider how they cope with their isolation and tendencies to run. Impression of participation: The patient sat through the first half of group without contributing to the group's conversation. He appeared alert and generally oriented. He expressed no SI/HI and displayed no overt psychotic symptoms. About senior living through the group the patient got up from his chair and said, \"We're okay,\" and left the room. He was not fully engaged in the group process. His affect was anxious and constricted. His mood matched his affect. This was the patient's first process group with the undersigned.